# Patient Record
Sex: FEMALE | Race: WHITE | Employment: OTHER | URBAN - METROPOLITAN AREA
[De-identification: names, ages, dates, MRNs, and addresses within clinical notes are randomized per-mention and may not be internally consistent; named-entity substitution may affect disease eponyms.]

---

## 2021-03-01 NOTE — PROGRESS NOTES
Patient pre-assessment complete for [facility] scheduled for 3/2/2021, arrival time 0600. Patient verified using . Patient instructed to bring all home medications in labeled bottles on the day of procedure. NPO status reinforced. Patient informed to take a full dose aspirin 325mg  or 81 mg x 4 on the day of procedure. Instructed they can take all other medications excluding vitamins & supplements. Patient verbalizes understanding of all instructions & denies any questions at this time.

## 2021-03-02 ENCOUNTER — HOSPITAL ENCOUNTER (OUTPATIENT)
Dept: CARDIAC CATH/INVASIVE PROCEDURES | Age: 81
Discharge: HOME | End: 2021-03-02
Attending: INTERNAL MEDICINE | Admitting: INTERNAL MEDICINE
Payer: MEDICARE

## 2021-03-02 VITALS
HEART RATE: 61 BPM | BODY MASS INDEX: 21.79 KG/M2 | TEMPERATURE: 98.2 F | WEIGHT: 123 LBS | SYSTOLIC BLOOD PRESSURE: 136 MMHG | DIASTOLIC BLOOD PRESSURE: 52 MMHG | OXYGEN SATURATION: 98 % | RESPIRATION RATE: 16 BRPM

## 2021-03-02 DIAGNOSIS — R07.2 PRECORDIAL PAIN: ICD-10-CM

## 2021-03-02 LAB
ANION GAP SERPL CALC-SCNC: 6 MMOL/L (ref 7–16)
ATRIAL RATE: 52 BPM
BUN SERPL-MCNC: 10 MG/DL (ref 8–23)
CALCIUM SERPL-MCNC: 8.8 MG/DL (ref 8.3–10.4)
CALCULATED P AXIS, ECG09: 68 DEGREES
CALCULATED R AXIS, ECG10: -49 DEGREES
CALCULATED T AXIS, ECG11: 55 DEGREES
CHLORIDE SERPL-SCNC: 108 MMOL/L (ref 98–107)
CO2 SERPL-SCNC: 27 MMOL/L (ref 21–32)
CREAT SERPL-MCNC: 0.78 MG/DL (ref 0.6–1)
DIAGNOSIS, 93000: NORMAL
ERYTHROCYTE [DISTWIDTH] IN BLOOD BY AUTOMATED COUNT: 12.7 % (ref 11.9–14.6)
GLUCOSE SERPL-MCNC: 80 MG/DL (ref 65–100)
HCT VFR BLD AUTO: 39.4 % (ref 35.8–46.3)
HGB BLD-MCNC: 13.3 G/DL (ref 11.7–15.4)
INR PPP: 0.9
MAGNESIUM SERPL-MCNC: 2.2 MG/DL (ref 1.8–2.4)
MCH RBC QN AUTO: 32.1 PG (ref 26.1–32.9)
MCHC RBC AUTO-ENTMCNC: 33.8 G/DL (ref 31.4–35)
MCV RBC AUTO: 95.2 FL (ref 79.6–97.8)
NRBC # BLD: 0 K/UL (ref 0–0.2)
P-R INTERVAL, ECG05: 166 MS
PLATELET # BLD AUTO: 214 K/UL (ref 150–450)
PMV BLD AUTO: 11.2 FL (ref 9.4–12.3)
POTASSIUM SERPL-SCNC: 4.2 MMOL/L (ref 3.5–5.1)
PROTHROMBIN TIME: 12.8 SEC (ref 12.5–14.7)
Q-T INTERVAL, ECG07: 422 MS
QRS DURATION, ECG06: 80 MS
QTC CALCULATION (BEZET), ECG08: 392 MS
RBC # BLD AUTO: 4.14 M/UL (ref 4.05–5.2)
SODIUM SERPL-SCNC: 141 MMOL/L (ref 136–145)
VENTRICULAR RATE, ECG03: 52 BPM
WBC # BLD AUTO: 5.5 K/UL (ref 4.3–11.1)

## 2021-03-02 PROCEDURE — 99152 MOD SED SAME PHYS/QHP 5/>YRS: CPT

## 2021-03-02 PROCEDURE — 93458 L HRT ARTERY/VENTRICLE ANGIO: CPT | Performed by: INTERNAL MEDICINE

## 2021-03-02 PROCEDURE — C1769 GUIDE WIRE: HCPCS

## 2021-03-02 PROCEDURE — 74011000250 HC RX REV CODE- 250: Performed by: INTERNAL MEDICINE

## 2021-03-02 PROCEDURE — 77030016699 HC CATH ANGI DX INFN1 CARD -A

## 2021-03-02 PROCEDURE — 83735 ASSAY OF MAGNESIUM: CPT

## 2021-03-02 PROCEDURE — 99153 MOD SED SAME PHYS/QHP EA: CPT

## 2021-03-02 PROCEDURE — 77030015766

## 2021-03-02 PROCEDURE — 85610 PROTHROMBIN TIME: CPT

## 2021-03-02 PROCEDURE — 85027 COMPLETE CBC AUTOMATED: CPT

## 2021-03-02 PROCEDURE — 80048 BASIC METABOLIC PNL TOTAL CA: CPT

## 2021-03-02 PROCEDURE — 99152 MOD SED SAME PHYS/QHP 5/>YRS: CPT | Performed by: INTERNAL MEDICINE

## 2021-03-02 PROCEDURE — 74011000636 HC RX REV CODE- 636: Performed by: INTERNAL MEDICINE

## 2021-03-02 PROCEDURE — 74011250636 HC RX REV CODE- 250/636: Performed by: INTERNAL MEDICINE

## 2021-03-02 PROCEDURE — C1894 INTRO/SHEATH, NON-LASER: HCPCS

## 2021-03-02 PROCEDURE — 93005 ELECTROCARDIOGRAM TRACING: CPT | Performed by: INTERNAL MEDICINE

## 2021-03-02 PROCEDURE — 77030029997 HC DEV COM RDL R BND TELE -B

## 2021-03-02 PROCEDURE — 93458 L HRT ARTERY/VENTRICLE ANGIO: CPT

## 2021-03-02 PROCEDURE — 77030013687 HC GD NDL BARD -B

## 2021-03-02 RX ORDER — MIDAZOLAM HYDROCHLORIDE 1 MG/ML
.5-2 INJECTION, SOLUTION INTRAMUSCULAR; INTRAVENOUS
Status: DISCONTINUED | OUTPATIENT
Start: 2021-03-02 | End: 2021-03-02 | Stop reason: HOSPADM

## 2021-03-02 RX ORDER — LIDOCAINE HYDROCHLORIDE 10 MG/ML
3-20 INJECTION, SOLUTION EPIDURAL; INFILTRATION; INTRACAUDAL; PERINEURAL ONCE
Status: COMPLETED | OUTPATIENT
Start: 2021-03-02 | End: 2021-03-02

## 2021-03-02 RX ORDER — GUAIFENESIN 100 MG/5ML
324 LIQUID (ML) ORAL ONCE
Status: DISCONTINUED | OUTPATIENT
Start: 2021-03-02 | End: 2021-03-02 | Stop reason: HOSPADM

## 2021-03-02 RX ORDER — SODIUM CHLORIDE 9 MG/ML
75 INJECTION, SOLUTION INTRAVENOUS CONTINUOUS
Status: DISCONTINUED | OUTPATIENT
Start: 2021-03-02 | End: 2021-03-02 | Stop reason: HOSPADM

## 2021-03-02 RX ORDER — HYDROMORPHONE HYDROCHLORIDE 2 MG/ML
1-2 INJECTION, SOLUTION INTRAMUSCULAR; INTRAVENOUS; SUBCUTANEOUS
Status: DISCONTINUED | OUTPATIENT
Start: 2021-03-02 | End: 2021-03-02 | Stop reason: HOSPADM

## 2021-03-02 RX ORDER — HEPARIN SODIUM 200 [USP'U]/100ML
2 INJECTION, SOLUTION INTRAVENOUS CONTINUOUS
Status: DISCONTINUED | OUTPATIENT
Start: 2021-03-02 | End: 2021-03-02 | Stop reason: HOSPADM

## 2021-03-02 RX ORDER — SODIUM CHLORIDE 0.9 % (FLUSH) 0.9 %
5 SYRINGE (ML) INJECTION AS NEEDED
Status: DISCONTINUED | OUTPATIENT
Start: 2021-03-02 | End: 2021-03-02 | Stop reason: HOSPADM

## 2021-03-02 RX ADMIN — IOPAMIDOL 90 ML: 755 INJECTION, SOLUTION INTRAVENOUS at 08:59

## 2021-03-02 RX ADMIN — HEPARIN SODIUM 2 ML: 10000 INJECTION INTRAVENOUS; SUBCUTANEOUS at 08:42

## 2021-03-02 RX ADMIN — LIDOCAINE HYDROCHLORIDE 3 ML: 10 INJECTION, SOLUTION EPIDURAL; INFILTRATION; INTRACAUDAL; PERINEURAL at 08:41

## 2021-03-02 RX ADMIN — MIDAZOLAM 2 MG: 1 INJECTION INTRAMUSCULAR; INTRAVENOUS at 08:27

## 2021-03-02 RX ADMIN — HEPARIN SODIUM 2 UNITS/HR: 5000 INJECTION, SOLUTION INTRAVENOUS; SUBCUTANEOUS at 08:09

## 2021-03-02 NOTE — PROGRESS NOTES
Patient up to bedside, vital signs and site stable. Patient ambulated to bathroom without difficulty. Patient voided without difficulty. Vascular site stable. Discharge instructions and home medications reviewed with patient. Time allowed for questions and answers. 1100 Patient ambulated second time without difficulty. Site stable after ambulation. Peripheral IV sites dc'd without difficulty with tips intact. Patient discharged to home with family.

## 2021-03-02 NOTE — PROCEDURES
Brief Cardiac Procedure Note    Patient: Cate Gallegos MRN: [id number]  SSN: [id number]    YOB: 1940  Age: [de-identified] y.o. Sex: female      Date of Procedure: 3/2/2021     Pre-procedure Diagnosis: Coronary Artery Disease    Post-procedure Diagnosis: Non-cardiac Chest Pain    Reason for Procedure: Worsening Angina    Procedure: Left Heart Catheterization    Brief Description of Procedure: via lra    Performed By: Brad Garcia MD     Assistants:     Anesthesia: Moderate Sedation    Estimated Blood Loss: Less than 10 mL      Specimens: None    Implants: None    Findings: nml ef and cors    Complications: None    Recommendations: Continue medical therapy.     Signed By: Brad Garcia MD     March 2, 2021

## 2021-03-02 NOTE — PROGRESS NOTES
Terumo band completely deflated. 1035 Terumo band removed from left wrist using sterile technique. Sterile dressing applied. No signs and symptoms of bleeding, oozing or hematoma.

## 2021-03-02 NOTE — PROGRESS NOTES
Report received from [facility] RN. Procedural findings communicated. Intra procedural  medication administration reviewed. Progression of care discussed.      Patient received into [address] [facility] 7 post sheath removal.     Access site without bleeding or swelling yes    Dressing dry and intact yes    Patient instructed to limit movement to left upper extremity    Routine post procedural vital signs and site assessment initiated yes

## 2021-03-02 NOTE — PROCEDURES
[facility]  CARDIAC CATH    Name:  Saadia Jansen  MR#:  [id number]  :  1940  ACCOUNT #:  [de-identified]  DATE OF SERVICE:  2021    PROCEDURES PERFORMED:  Cardiac catheterization. PREOPERATIVE DIAGNOSES:  Chest pain due to coronary artery obstruction. POSTOPERATIVE DIAGNOSES:  Chest pain of uncertain etiology. SURGEON:  Dipesh Singh MD    ASSISTANT:  Benjamin Meckel, RCIS    ESTIMATED BLOOD LOSS:  Zero. SPECIMENS REMOVED:  Zero. COMPLICATIONS:  Zero. IMPLANTS:  none. ANESTHESIA:  Conscious sedation administered by Jarek Flores RN under my direction with continuous blood pressure, pulse and oxygen saturation monitoring. A total of 2 mg of Versed was given during the procedure whose start time was 08:30 and end time was 08:59. HISTORY:  This is an 40-year-old lady with multiple risk factors for coronary artery disease. She is having problems with persistent chest pain. A nuclear stress test suggested lateral wall ischemia. Cardiac catheterization is recommended. PROCEDURE:  Left heart catheterization with left ventriculography and coronary angiography was carried out from the left radial artery by modified Seldinger technique with a 5-German multipurpose, Cossayuna and #3.5 left Desiree. She tolerated it well. FINDINGS:  The central aortic pressure is 120/70 mmHg. Left ventricular end-diastolic pressure is 12 mmHg. There is no gradient on pullback across the aortic valve. The overall left ventricular size is normal.  The wall motion is normal.  Ejection fraction is 63%. Coronary angiography reveals the right coronary artery to be normal.  On the left, the main is normal.  It divides into an LAD and left circumflex. The LAD has minor midportion atherosclerotic irregularity. The left circumflex is a large normal looking vessel. IMPRESSION:  1. Normal left ventricular function. 2.  Minor coronary artery disease.     RECOMMENDATIONS:  Consider other cause for this patient's chest pain other than ischemic heart disease.         Susan Alford MD      [id number]  D:  03/02/2021 9:12  T:  03/02/2021 10:17  JOB #:  [id number]

## 2021-03-02 NOTE — PROGRESS NOTES
Patient received to [address] [place] [address] room # 12  Ambulatory from [facility]. Patient scheduled for [facility] today with Dr Tera Young. Procedure reviewed & questions answered, voiced good understanding consent obtained & placed on chart. All medications and medical history reviewed. Will prep patient per orders. Patient & family updated on plan of care. The patient has a fraility score of 3-MANAGING WELL, based on reports no recent falls.

## 2021-03-02 NOTE — DISCHARGE INSTRUCTIONS
HEART CATHETERIZATION/ANGIOGRAPHY DISCHARGE INSTRUCTIONS    1. Check puncture site frequently for swelling or bleeding. If there is any bleeding, lie down and apply pressure over the area with a clean towel or washcloth and call 911. Notify your doctor for any redness, swelling, drainage, or oozing from the puncture site. Notify your doctor for any fever or chills. 2. If the extremity becomes cold, numb, or painful call Dr. Bianka Spann at [phone].  3. Activity should be limited for the next 48 hours. Avoid pushing, pulling and bending of affected wrist for 48 hours. No heavy lifting (anything over 5 pounds) for 3 days. No driving for 48 hours. 4. You may resume your usual diet. Drink more fluids than usual.  5. Have a responsible person drive you home and stay with you for at least 24 hours after your heart catheterization/angiography. 6. You may remove bandage from your right arm  in 24 hours. You may shower in 24 hours. No tub baths, hot tubs, or swimming for 1 week. Do not place any lotions, creams, powders, or ointments over puncture site for 1 week. You may place a clean band-aid over the puncture site each day for 5 days. Change daily. I have read the above instructions and have had the opportunity to ask questions.

## 2021-03-02 NOTE — PROGRESS NOTES
TRANSFER - OUT REPORT:    [facility]  LRA  Diagnostic clean  Versed 2 mg  Band 12 ml  No bleeding/hematoma    Verbal report given to Maggy(name) on Earle Ruffin  being transferred to CPRU(unit) for routine progression of care       Report consisted of patients Situation, Background, Assessment and   Recommendations(SBAR). Information from the following report(s) SBAR and Procedure Summary was reviewed with the receiving nurse. Lines:   Peripheral IV 03/02/21 Right Antecubital (Active)       Peripheral IV 03/02/21 Left Antecubital (Active)        Opportunity for questions and clarification was provided.

## 2023-05-26 ENCOUNTER — APPOINTMENT (RX ONLY)
Dept: URBAN - METROPOLITAN AREA CLINIC 330 | Facility: CLINIC | Age: 83
Setting detail: DERMATOLOGY
End: 2023-05-26

## 2023-05-26 DIAGNOSIS — L82.1 OTHER SEBORRHEIC KERATOSIS: ICD-10-CM

## 2023-05-26 DIAGNOSIS — D22 MELANOCYTIC NEVI: ICD-10-CM

## 2023-05-26 DIAGNOSIS — L57.8 OTHER SKIN CHANGES DUE TO CHRONIC EXPOSURE TO NONIONIZING RADIATION: ICD-10-CM

## 2023-05-26 DIAGNOSIS — Z71.89 OTHER SPECIFIED COUNSELING: ICD-10-CM

## 2023-05-26 DIAGNOSIS — L81.4 OTHER MELANIN HYPERPIGMENTATION: ICD-10-CM

## 2023-05-26 PROBLEM — D22.71 MELANOCYTIC NEVI OF RIGHT LOWER LIMB, INCLUDING HIP: Status: ACTIVE | Noted: 2023-05-26

## 2023-05-26 PROBLEM — D22.5 MELANOCYTIC NEVI OF TRUNK: Status: ACTIVE | Noted: 2023-05-26

## 2023-05-26 PROCEDURE — ? TREATMENT REGIMEN

## 2023-05-26 PROCEDURE — ? REFERRAL CORRESPONDENCE

## 2023-05-26 PROCEDURE — ? COUNSELING

## 2023-05-26 PROCEDURE — ? FULL BODY SKIN EXAM

## 2023-05-26 PROCEDURE — ? EDUCATIONAL RESOURCES PROVIDED

## 2023-05-26 PROCEDURE — 99203 OFFICE O/P NEW LOW 30 MIN: CPT

## 2023-05-26 ASSESSMENT — LOCATION ZONE DERM
LOCATION ZONE: LEG
LOCATION ZONE: TRUNK
LOCATION ZONE: NECK
LOCATION ZONE: FACE
LOCATION ZONE: ARM

## 2023-05-26 ASSESSMENT — LOCATION DETAILED DESCRIPTION DERM
LOCATION DETAILED: LEFT PROXIMAL DORSAL FOREARM
LOCATION DETAILED: LEFT FOREHEAD
LOCATION DETAILED: LEFT INFERIOR CENTRAL MALAR CHEEK
LOCATION DETAILED: RIGHT INFERIOR ANTERIOR NECK
LOCATION DETAILED: RIGHT PROXIMAL POSTERIOR THIGH
LOCATION DETAILED: RIGHT PROXIMAL DORSAL FOREARM
LOCATION DETAILED: RIGHT MEDIAL TRAPEZIAL NECK
LOCATION DETAILED: INFERIOR THORACIC SPINE

## 2023-05-26 ASSESSMENT — LOCATION SIMPLE DESCRIPTION DERM
LOCATION SIMPLE: LEFT FOREHEAD
LOCATION SIMPLE: LEFT CHEEK
LOCATION SIMPLE: LEFT FOREARM
LOCATION SIMPLE: RIGHT ANTERIOR NECK
LOCATION SIMPLE: POSTERIOR NECK
LOCATION SIMPLE: RIGHT POSTERIOR THIGH
LOCATION SIMPLE: RIGHT FOREARM
LOCATION SIMPLE: UPPER BACK

## 2023-05-26 NOTE — PROCEDURE: MIPS QUALITY
Quality 431: Preventive Care And Screening: Unhealthy Alcohol Use - Screening: Patient not identified as an unhealthy alcohol user when screened for unhealthy alcohol use using a systematic screening method
Quality 110: Preventive Care And Screening: Influenza Immunization: Influenza Immunization Administered during Influenza season
Quality 47: Advance Care Plan: Advance care planning not documented, reason not otherwise specified.
Quality 130: Documentation Of Current Medications In The Medical Record: Current Medications Documented
Quality 402: Tobacco Use And Help With Quitting Among Adolescents: Patient screened for tobacco and never smoked
Quality 226: Preventive Care And Screening: Tobacco Use: Screening And Cessation Intervention: Patient screened for tobacco use and is an ex/non-smoker
Detail Level: Detailed

## 2024-01-03 ENCOUNTER — TELEPHONE (OUTPATIENT)
Dept: PRIMARY CARE CLINIC | Facility: CLINIC | Age: 84
End: 2024-01-03

## 2024-01-03 PROBLEM — M81.0 OSTEOPOROSIS OF MULTIPLE SITES WITHOUT PATHOLOGICAL FRACTURE: Status: ACTIVE | Noted: 2024-01-03

## 2024-01-03 SDOH — HEALTH STABILITY: PHYSICAL HEALTH: ON AVERAGE, HOW MANY DAYS PER WEEK DO YOU ENGAGE IN MODERATE TO STRENUOUS EXERCISE (LIKE A BRISK WALK)?: 7 DAYS

## 2024-01-03 SDOH — HEALTH STABILITY: PHYSICAL HEALTH: ON AVERAGE, HOW MANY MINUTES DO YOU ENGAGE IN EXERCISE AT THIS LEVEL?: 20 MIN

## 2024-02-16 ENCOUNTER — TELEPHONE (OUTPATIENT)
Age: 84
End: 2024-02-16

## 2024-02-16 NOTE — TELEPHONE ENCOUNTER
Pt.is calling reporting she has been more fatigued lately and labored breathing.Having harder time getting up/down stairs which is new for her.She has been having CP off/on.Appt.made 2/20 11:45.

## 2024-02-20 ENCOUNTER — OFFICE VISIT (OUTPATIENT)
Age: 84
End: 2024-02-20
Payer: MEDICARE

## 2024-02-20 VITALS
HEIGHT: 63 IN | DIASTOLIC BLOOD PRESSURE: 62 MMHG | BODY MASS INDEX: 23.83 KG/M2 | SYSTOLIC BLOOD PRESSURE: 134 MMHG | WEIGHT: 134.5 LBS | HEART RATE: 64 BPM

## 2024-02-20 DIAGNOSIS — R07.2 PRECORDIAL PAIN: ICD-10-CM

## 2024-02-20 DIAGNOSIS — R07.2 PRECORDIAL PAIN: Primary | ICD-10-CM

## 2024-02-20 PROCEDURE — 99214 OFFICE O/P EST MOD 30 MIN: CPT | Performed by: INTERNAL MEDICINE

## 2024-02-20 PROCEDURE — 93000 ELECTROCARDIOGRAM COMPLETE: CPT | Performed by: INTERNAL MEDICINE

## 2024-02-20 PROCEDURE — 1123F ACP DISCUSS/DSCN MKR DOCD: CPT | Performed by: INTERNAL MEDICINE

## 2024-02-20 RX ORDER — AZELASTINE 1 MG/ML
1 SPRAY, METERED NASAL 2 TIMES DAILY
COMMUNITY
Start: 2023-09-27

## 2024-02-20 RX ORDER — CETIRIZINE HYDROCHLORIDE 10 MG/1
10 TABLET ORAL DAILY
COMMUNITY

## 2024-02-20 NOTE — PROGRESS NOTES
[facility] CARDIOLOGY  [address] [place] [address], [address]  [place] [address]  PHONE: [phone]        24        NAME:  Irma Kidd  : 1940  MRN: [id number]     CHIEF COMPLAINT:    Chest Pain      SUBJECTIVE:       First visit 3 years. Returns c/o chest pain and fatigue and reyes.        Medications were all reviewed with the patient today and updated as necessary.   Current Outpatient Medications   Medication Sig    azelastine (ASTELIN) 0.1 % nasal spray 1 spray by Nasal route 2 times daily    cetirizine (ZYRTEC) 10 MG tablet Take 1 tablet by mouth daily    Coenzyme Q10 10 MG CAPS Take 19 mg by mouth daily    UNABLE TO FIND MCT Oil  Vision Essentials Gold  Eyedrate 1300 mg  Enterovite  Digestzymes  optiMagNeuro  Methyl Guard Plus  Insomnitol  Adrenastim  ADK evail  Optifiberlean  OmegaPure 900 tg  Trizomal Glutathione  Turmeric    vitamin D (CHOLECALCIFEROL) 25 MCG (1000 UT) TABS tablet Take by mouth daily    folic acid (FOLVITE) 800 MCG tablet Take 1 tablet by mouth daily    levothyroxine (SYNTHROID) 75 MCG tablet Take by mouth every morning (before breakfast)    psyllium (METAMUCIL) 58.6 % packet Take by mouth 2 times daily     No current facility-administered medications for this visit.        Allergies   Allergen Reactions    Tetracyclines & Related Other (See Comments)     She cannot remember reaction - It was many years ago   GI intolerance           PHYSICAL EXAM:     Wt Readings from Last 3 Encounters:   24 61 kg (134 lb 8 oz)   21 55.4 kg (122 lb 3.2 oz)   21 55.8 kg (123 lb)     BP Readings from Last 3 Encounters:   24 134/62   21 130/68   21 (!) 128/58       /62   Pulse 64   Ht 1.6 m (5' 3\")   Wt 61 kg (134 lb 8 oz)   BMI 23.83 kg/m²     Physical Exam  Vitals reviewed.   HENT:      Head: Normocephalic and atraumatic.   Eyes:      Extraocular Movements: Extraocular movements intact.      Pupils: Pupils are equal, round, and reactive to

## 2024-02-21 LAB — NT PRO BNP: 168 PG/ML

## 2024-03-12 ENCOUNTER — OFFICE VISIT (OUTPATIENT)
Dept: PRIMARY CARE CLINIC | Facility: CLINIC | Age: 84
End: 2024-03-12
Payer: MEDICARE

## 2024-03-12 VITALS
HEART RATE: 78 BPM | WEIGHT: 134 LBS | OXYGEN SATURATION: 97 % | SYSTOLIC BLOOD PRESSURE: 124 MMHG | DIASTOLIC BLOOD PRESSURE: 64 MMHG | BODY MASS INDEX: 23.74 KG/M2

## 2024-03-12 DIAGNOSIS — M81.0 AGE-RELATED OSTEOPOROSIS WITHOUT CURRENT PATHOLOGICAL FRACTURE: Primary | ICD-10-CM

## 2024-03-12 DIAGNOSIS — J30.2 SEASONAL ALLERGIES: ICD-10-CM

## 2024-03-12 DIAGNOSIS — H02.88A MEIBOMIAN GLAND DYSFUNCTION (MGD), BILATERAL, BOTH UPPER AND LOWER LIDS: ICD-10-CM

## 2024-03-12 DIAGNOSIS — R73.03 PREDIABETES: ICD-10-CM

## 2024-03-12 DIAGNOSIS — F51.01 PRIMARY INSOMNIA: ICD-10-CM

## 2024-03-12 DIAGNOSIS — M81.0 OSTEOPOROSIS OF MULTIPLE SITES WITHOUT PATHOLOGICAL FRACTURE: ICD-10-CM

## 2024-03-12 DIAGNOSIS — Z78.0 POST-MENOPAUSAL: ICD-10-CM

## 2024-03-12 DIAGNOSIS — E03.9 HYPOTHYROIDISM, UNSPECIFIED TYPE: ICD-10-CM

## 2024-03-12 DIAGNOSIS — H02.88B MEIBOMIAN GLAND DYSFUNCTION (MGD), BILATERAL, BOTH UPPER AND LOWER LIDS: ICD-10-CM

## 2024-03-12 DIAGNOSIS — H04.123 BILATERAL DRY EYES: ICD-10-CM

## 2024-03-12 DIAGNOSIS — Z12.31 ENCOUNTER FOR SCREENING MAMMOGRAM FOR MALIGNANT NEOPLASM OF BREAST: ICD-10-CM

## 2024-03-12 DIAGNOSIS — E78.00 HIGH CHOLESTEROL: ICD-10-CM

## 2024-03-12 DIAGNOSIS — Z12.39 ENCOUNTER FOR SCREENING FOR MALIGNANT NEOPLASM OF BREAST, UNSPECIFIED SCREENING MODALITY: ICD-10-CM

## 2024-03-12 PROCEDURE — 1123F ACP DISCUSS/DSCN MKR DOCD: CPT | Performed by: INTERNAL MEDICINE

## 2024-03-12 PROCEDURE — 99204 OFFICE O/P NEW MOD 45 MIN: CPT | Performed by: INTERNAL MEDICINE

## 2024-03-12 RX ORDER — LEVOTHYROXINE SODIUM 0.07 MG/1
75 TABLET ORAL
Qty: 90 TABLET | Refills: 0 | Status: SHIPPED | OUTPATIENT
Start: 2024-03-12

## 2024-03-12 RX ORDER — TRAZODONE HYDROCHLORIDE 50 MG/1
50 TABLET ORAL NIGHTLY
Qty: 90 TABLET | Refills: 1 | Status: SHIPPED | OUTPATIENT
Start: 2024-03-12

## 2024-03-12 SDOH — HEALTH STABILITY: PHYSICAL HEALTH: ON AVERAGE, HOW MANY DAYS PER WEEK DO YOU ENGAGE IN MODERATE TO STRENUOUS EXERCISE (LIKE A BRISK WALK)?: 7 DAYS

## 2024-03-12 SDOH — HEALTH STABILITY: PHYSICAL HEALTH: ON AVERAGE, HOW MANY MINUTES DO YOU ENGAGE IN EXERCISE AT THIS LEVEL?: 10 MIN

## 2024-03-12 ASSESSMENT — PATIENT HEALTH QUESTIONNAIRE - PHQ9
2. FEELING DOWN, DEPRESSED OR HOPELESS: 0
SUM OF ALL RESPONSES TO PHQ QUESTIONS 1-9: 0
1. LITTLE INTEREST OR PLEASURE IN DOING THINGS: 0
SUM OF ALL RESPONSES TO PHQ QUESTIONS 1-9: 0
SUM OF ALL RESPONSES TO PHQ9 QUESTIONS 1 & 2: 0
SUM OF ALL RESPONSES TO PHQ QUESTIONS 1-9: 0
SUM OF ALL RESPONSES TO PHQ QUESTIONS 1-9: 0

## 2024-03-12 NOTE — PROGRESS NOTES
FOLLOW UP VISIT    Subjective:    Irma Kidd (: 1940) is a 83 y.o., female,   Chief Complaint   Patient presents with    New Patient       HPI:  HPI  83 year old in to [facility].   Chest Pain has seen Cardiology Dr. Ledezma  Anxiety   Depression  GERD   Hypothyroid on meds last labs nl   High cholesterol- pt states she's as never had this  Adrenal D/O is going to [facility]  High blood sugar A1C 5,8   HCM  Mammogram  DEXA due  Colonoscopy     The following portions of the patient's history were reviewed and updated as appropriate:      Past Medical History:   Diagnosis Date    Allergic rhinitis minor but very early    Anxiety ,,    Stress reactions    Arthritis     Depression     GERD (gastroesophageal reflux disease) 2020    treated by [facility]    Hearing loss 2004    Hearing Aids    Hypothyroidism     Stress reaction    Psychiatric disorder     Depression, Anxiety    Thyroid disease     hypothryoidism       Past Surgical History:   Procedure Laterality Date    EYE SURGERY      OTHER SURGICAL HISTORY      Tonsilectomy    TONSILLECTOMY         Family History   Problem Relation Age of Onset    Breast Cancer Mother         Late in life    Depression Father         Childhood abuse    Hearing Loss Father     Heart Disease Father     Breast Cancer Maternal Aunt         When she conceived child    Breast Cancer Maternal Aunt         Midlife       Social History     Socioeconomic History    Marital status:      Spouse name: Not on file    Number of children: Not on file    Years of education: Not on file    Highest education level: Not on file   Occupational History    Not on file   Tobacco Use    Smoking status: Never    Smokeless tobacco: Never    Tobacco comments:     None   Substance and Sexual Activity    Alcohol use: Never    Drug use: Never    Sexual activity: Not Currently     Partners: Male     Comment:   56 years   Other Topics Concern    Not on file   Social

## 2024-05-03 ENCOUNTER — TELEPHONE (OUTPATIENT)
Dept: PRIMARY CARE CLINIC | Facility: CLINIC | Age: 84
End: 2024-05-03

## 2024-05-03 NOTE — TELEPHONE ENCOUNTER
Pt called and said dr poole had given referrals for mamogram and another test but they reached out to the pt and said that there are no orders in her chart for these tests to be done.  Pt said she would call back again on Monday to get the status of this if she doesn't hear back today

## 2024-05-03 NOTE — TELEPHONE ENCOUNTER
LVM notifying Pt she has an appointment for the bone density and the mammo scheduled for 05/07/24 and left radiology/sched phone number if she had any additional questions/concerns.

## 2024-05-07 ENCOUNTER — HOSPITAL ENCOUNTER (OUTPATIENT)
Dept: MAMMOGRAPHY | Age: 84
Discharge: HOME | End: 2024-05-10
Attending: INTERNAL MEDICINE
Payer: MEDICARE

## 2024-05-07 DIAGNOSIS — Z12.31 ENCOUNTER FOR SCREENING MAMMOGRAM FOR MALIGNANT NEOPLASM OF BREAST: ICD-10-CM

## 2024-05-07 DIAGNOSIS — Z12.39 ENCOUNTER FOR SCREENING FOR MALIGNANT NEOPLASM OF BREAST, UNSPECIFIED SCREENING MODALITY: ICD-10-CM

## 2024-05-07 DIAGNOSIS — M81.0 OSTEOPOROSIS OF MULTIPLE SITES WITHOUT PATHOLOGICAL FRACTURE: ICD-10-CM

## 2024-05-07 PROCEDURE — 77080 DXA BONE DENSITY AXIAL: CPT

## 2024-05-07 PROCEDURE — 77067 SCR MAMMO BI INCL CAD: CPT

## 2024-05-08 ENCOUNTER — TELEPHONE (OUTPATIENT)
Dept: PRIMARY CARE CLINIC | Facility: CLINIC | Age: 84
End: 2024-05-08

## 2024-05-08 NOTE — TELEPHONE ENCOUNTER
Spoke with the patient about her bone density results and let her know that. The doctor said she has Osteoporosis and she will go over her results with the patient at her next apt on 5/15.

## 2024-05-08 NOTE — TELEPHONE ENCOUNTER
----- Message from Nevaeh Zamora MD sent at 5/8/2024 10:20 AM EDT -----  Please inform patient she has osteoporosis- we will discuss this at her apt next week

## 2024-05-13 SDOH — ECONOMIC STABILITY: HOUSING INSECURITY
IN THE LAST 12 MONTHS, WAS THERE A TIME WHEN YOU DID NOT HAVE A STEADY PLACE TO SLEEP OR SLEPT IN A SHELTER (INCLUDING NOW)?: NO

## 2024-05-13 SDOH — ECONOMIC STABILITY: FOOD INSECURITY: WITHIN THE PAST 12 MONTHS, YOU WORRIED THAT YOUR FOOD WOULD RUN OUT BEFORE YOU GOT MONEY TO BUY MORE.: NEVER TRUE

## 2024-05-13 SDOH — ECONOMIC STABILITY: INCOME INSECURITY: HOW HARD IS IT FOR YOU TO PAY FOR THE VERY BASICS LIKE FOOD, HOUSING, MEDICAL CARE, AND HEATING?: NOT VERY HARD

## 2024-05-13 SDOH — ECONOMIC STABILITY: FOOD INSECURITY: WITHIN THE PAST 12 MONTHS, THE FOOD YOU BOUGHT JUST DIDN'T LAST AND YOU DIDN'T HAVE MONEY TO GET MORE.: NEVER TRUE

## 2024-05-13 SDOH — ECONOMIC STABILITY: TRANSPORTATION INSECURITY
IN THE PAST 12 MONTHS, HAS LACK OF TRANSPORTATION KEPT YOU FROM MEETINGS, WORK, OR FROM GETTING THINGS NEEDED FOR DAILY LIVING?: NO

## 2024-05-15 ENCOUNTER — OFFICE VISIT (OUTPATIENT)
Dept: PRIMARY CARE CLINIC | Facility: CLINIC | Age: 84
End: 2024-05-15
Payer: MEDICARE

## 2024-05-15 VITALS
OXYGEN SATURATION: 97 % | BODY MASS INDEX: 24.34 KG/M2 | DIASTOLIC BLOOD PRESSURE: 68 MMHG | WEIGHT: 137.4 LBS | SYSTOLIC BLOOD PRESSURE: 122 MMHG | HEART RATE: 67 BPM | HEIGHT: 63 IN

## 2024-05-15 DIAGNOSIS — R73.03 PREDIABETES: ICD-10-CM

## 2024-05-15 DIAGNOSIS — E78.00 HIGH CHOLESTEROL: ICD-10-CM

## 2024-05-15 DIAGNOSIS — E03.9 HYPOTHYROIDISM, UNSPECIFIED TYPE: ICD-10-CM

## 2024-05-15 DIAGNOSIS — M81.0 AGE-RELATED OSTEOPOROSIS WITHOUT CURRENT PATHOLOGICAL FRACTURE: Primary | ICD-10-CM

## 2024-05-15 LAB
ALBUMIN SERPL-MCNC: 3.6 G/DL (ref 3.2–4.6)
ALBUMIN/GLOB SERPL: 1.1 (ref 1–1.9)
ALP SERPL-CCNC: 55 U/L (ref 35–104)
ALT SERPL-CCNC: 14 U/L (ref 12–65)
ANION GAP SERPL CALC-SCNC: 9 MMOL/L (ref 9–18)
AST SERPL-CCNC: 25 U/L (ref 15–37)
BILIRUB SERPL-MCNC: 0.3 MG/DL (ref 0–1.2)
BUN SERPL-MCNC: 18 MG/DL (ref 8–23)
CALCIUM SERPL-MCNC: 9.6 MG/DL (ref 8.8–10.2)
CHLORIDE SERPL-SCNC: 101 MMOL/L (ref 98–107)
CHOLEST SERPL-MCNC: 173 MG/DL (ref 0–200)
CO2 SERPL-SCNC: 28 MMOL/L (ref 20–28)
CREAT SERPL-MCNC: 0.8 MG/DL (ref 0.6–1.1)
EST. AVERAGE GLUCOSE BLD GHB EST-MCNC: 121 MG/DL
GLOBULIN SER CALC-MCNC: 3.3 G/DL (ref 2.3–3.5)
GLUCOSE SERPL-MCNC: 97 MG/DL (ref 70–99)
HBA1C MFR BLD: 5.9 % (ref 0–5.6)
HDLC SERPL-MCNC: 63 MG/DL (ref 40–60)
HDLC SERPL: 2.7 (ref 0–5)
LDLC SERPL CALC-MCNC: 94 MG/DL (ref 0–100)
POTASSIUM SERPL-SCNC: 4.7 MMOL/L (ref 3.5–5.1)
PROT SERPL-MCNC: 7 G/DL (ref 6.3–8.2)
SODIUM SERPL-SCNC: 138 MMOL/L (ref 136–145)
TRIGL SERPL-MCNC: 80 MG/DL (ref 0–150)
TSH, 3RD GENERATION: 3.37 UIU/ML (ref 0.27–4.2)
VLDLC SERPL CALC-MCNC: 16 MG/DL (ref 6–23)

## 2024-05-15 PROCEDURE — 99214 OFFICE O/P EST MOD 30 MIN: CPT | Performed by: INTERNAL MEDICINE

## 2024-05-15 PROCEDURE — 1123F ACP DISCUSS/DSCN MKR DOCD: CPT | Performed by: INTERNAL MEDICINE

## 2024-05-15 ASSESSMENT — ENCOUNTER SYMPTOMS: RESPIRATORY NEGATIVE: 1

## 2024-05-15 NOTE — PROGRESS NOTES
vitamin D (CHOLECALCIFEROL) 125 MCG (5000 UT) CAPS capsule Take by mouth daily      psyllium (METAMUCIL) 58.6 % packet Take by mouth 2 times daily      traZODone (DESYREL) 50 MG tablet Take 1 tablet by mouth nightly (Patient not taking: Reported on 5/15/2024) 90 tablet 1     No current facility-administered medications for this visit.       Allergies as of 05/15/2024 - Fully Reviewed 05/15/2024   Allergen Reaction Noted    Tetracyclines & related Other (See Comments) 07/19/2017       Review of Systems   Respiratory: Negative.     Genitourinary: Negative.        Objective:    Blood pressure 122/68, pulse 67, height 1.6 m (5' 3\"), weight 62.3 kg (137 lb 6.4 oz), SpO2 97 %.    Physical Exam  Vitals and nursing note reviewed.   Constitutional:       Appearance: Normal appearance.   Cardiovascular:      Rate and Rhythm: Normal rate.      Pulses: Normal pulses.      Heart sounds: Normal heart sounds.   Pulmonary:      Effort: Pulmonary effort is normal.   Neurological:      Mental Status: She is alert.         No results found for this visit on 05/15/24.    Assessent & Plan    83 year old in for follow up  Chest Pain has seen Cardiology Dr. Ledezma  Anxiety   Factor V lieden d/o   Depression  GERD   Hypothyroid on meds last labs nl check labs  Osteoporosis- on did not tolearte Bisphosphonates will send to Rheum  High cholesterol- pt states she's as never had this  Adrenal D/O is going to [facility]  High blood sugar A1C 5,8 last time  HCM  Mammogram done DEXA osteoproosis Colonoscopy     The patient and/or patient representative voiced understanding and agreement with the current diagnoses, recommendations, and possible side effects.    No follow-up provider specified.      Nevaeh Zamora MD

## 2024-05-28 ENCOUNTER — TELEPHONE (OUTPATIENT)
Dept: PRIMARY CARE CLINIC | Facility: CLINIC | Age: 84
End: 2024-05-28

## 2024-05-28 NOTE — TELEPHONE ENCOUNTER
Pt reports   Bitten by tick to right eyelash approx 2 weeks ago- tick fully removed   Pt has completed one round of PO ABT, and taking Benadyrl and \"icing\" eye daily  Currently taking Ceftin 500 mg once daily for 14 days will complete this Friday 05/30/24  Right eyelash remains swollen and red  OV sched with PCP for Thursday 05/30 @ 11:10

## 2024-05-28 NOTE — TELEPHONE ENCOUNTER
----- Message from Wendy Faria sent at 5/28/2024 11:57 AM EDT -----  Regarding: [facility] Appointment Request  [facility] Appointment Request    Patient needs appointment for [facility] Appointment Type: New to Provider.    Reason for Appointment Request: Available appointments did not meet patient need   Additional Information: Pt wants to schedule an appointment with Dr. Nevaeh Zamora because she said that she was bitten by the tick attach in her eyelid in her eyelashes about 2 weeks ago. She's available for First 2 weeks of june  --------------------------------------------------------------------------------------------------------------------------    Relationship to Patient: Self     Call Back Information: OK to leave message on voicemail  Preferred Call Back Number: Phone [phone]

## 2024-05-30 ENCOUNTER — OFFICE VISIT (OUTPATIENT)
Dept: PRIMARY CARE CLINIC | Facility: CLINIC | Age: 84
End: 2024-05-30
Payer: MEDICARE

## 2024-05-30 VITALS
DIASTOLIC BLOOD PRESSURE: 62 MMHG | SYSTOLIC BLOOD PRESSURE: 128 MMHG | BODY MASS INDEX: 24.06 KG/M2 | HEIGHT: 63 IN | OXYGEN SATURATION: 98 % | WEIGHT: 135.8 LBS

## 2024-05-30 DIAGNOSIS — J30.2 SEASONAL ALLERGIES: ICD-10-CM

## 2024-05-30 DIAGNOSIS — E78.00 HIGH CHOLESTEROL: ICD-10-CM

## 2024-05-30 DIAGNOSIS — E03.9 HYPOTHYROIDISM, UNSPECIFIED TYPE: ICD-10-CM

## 2024-05-30 DIAGNOSIS — M81.0 AGE-RELATED OSTEOPOROSIS WITHOUT CURRENT PATHOLOGICAL FRACTURE: Primary | ICD-10-CM

## 2024-05-30 DIAGNOSIS — R73.03 PREDIABETES: ICD-10-CM

## 2024-05-30 PROCEDURE — 1123F ACP DISCUSS/DSCN MKR DOCD: CPT | Performed by: INTERNAL MEDICINE

## 2024-05-30 PROCEDURE — 99214 OFFICE O/P EST MOD 30 MIN: CPT | Performed by: INTERNAL MEDICINE

## 2024-05-30 RX ORDER — CEFUROXIME AXETIL 500 MG/1
500 TABLET ORAL 2 TIMES DAILY
COMMUNITY
Start: 2024-05-18 | End: 2024-06-01

## 2024-05-30 RX ORDER — TRIAMCINOLONE ACETONIDE 5 MG/G
CREAM TOPICAL
Qty: 15 G | Refills: 1 | Status: SHIPPED | OUTPATIENT
Start: 2024-05-30 | End: 2024-06-06

## 2024-05-30 ASSESSMENT — ENCOUNTER SYMPTOMS: RESPIRATORY NEGATIVE: 1

## 2024-05-30 NOTE — PROGRESS NOTES
FOLLOW UP VISIT    Subjective:    Irma Kidd (: 1940) is a 83 y.o., female,   Chief Complaint   Patient presents with    Insect Bite     Bitten by tick to right eyelash approx 2 weeks ago- tick fully removed   Pt has completed one round of PO ABT, and taking Benadyrl and \"icing\" eye daily       HPI:  Insect Bite      83 year old in to follow up.  She had a bug bite in her eye and was given two antibiotics has two doses left.  She is also c/o ezcema.  She also has allergies   Chest Pain has seen Cardiology Dr. Ledezma  Anxiety   Factor V lieden d/o   Depression  GERD   Hypothyroid on meds last labs nl   Osteoporosis- on did not tolearte Bisphosphonates   High cholesterol- pt states she's as never had this  [facility]  High blood sugar A1C 5,8   HCM  Mammogram done DEXA osteoproosis Colonoscopy     The following portions of the patient's history were reviewed and updated as appropriate:      Past Medical History:   Diagnosis Date    Allergic rhinitis minor but very early    Anxiety 1958,1960,    Stress reactions    Arthritis     Depression     GERD (gastroesophageal reflux disease) 2020    treated by [facility]    Hearing loss 2004    Hearing Aids    Hypothyroidism     Stress reaction    Psychiatric disorder     Depression, Anxiety    Thyroid disease     hypothryoidism       Past Surgical History:   Procedure Laterality Date    EYE SURGERY      OTHER SURGICAL HISTORY      Tonsilectomy    TONSILLECTOMY         Family History   Problem Relation Age of Onset    Breast Cancer Mother         Late in life    Depression Father         Childhood abuse    Hearing Loss Father     Heart Disease Father     Breast Cancer Maternal Aunt         When she conceived child    Breast Cancer Maternal Aunt         Midlife       Social History     Socioeconomic History    Marital status:      Spouse name: Not on file    Number of children: Not on file    Years of education: Not on file

## 2024-06-05 NOTE — PROGRESS NOTES
Medicare Annual Wellness Visit    Irma Kidd is here for No chief complaint on file.    Assessment & Plan   Medicare Wellness  Recommendations for Preventive Services Due: see orders and patient instructions/AVS.  Recommended screening schedule for the next 5-10 years is provided to the patient in written form: see Patient Instructions/AVS.     No follow-ups on file.     Subjective       Patient's complete Health Risk Assessment and screening values have been reviewed and are found in Flowsheets. The following problems were reviewed today and where indicated follow up appointments were made and/or referrals ordered.    No Positive Risk Factors identified today.                                  Objective      Patient-Reported Vitals  No data recorded          Allergies   Allergen Reactions    Tetracyclines & Related Other (See Comments)     She cannot remember reaction - It was many years ago   GI intolerance     Prior to Visit Medications    Medication Sig Taking? Authorizing Provider   triamcinolone (ARISTOCORT) 0.5 % cream Apply topically 2 times daily.  Nevaeh Zamora MD   NONFORMKATHARINE Take by mouth at bedtime Insomnitol supplement (melatonin, Valorin root, and additional herbs)  Destiney Bond MD NONFORMULARY Take 2 capsules by mouth daily Joint replete (NMA) [employer]  Destiney Bond MD NONFORMULARY Take 3 capsules by mouth daily Digestzymes (DFH)  Destiney Bond MD NONFORMULARY Take 2 capsules by mouth daily Pregnenolone 25 (BRC) biotic research  Destiney Bond MD NONFORMULARY Take 1 capsule by mouth daily Methyl Guard Plus  Destiney Bond MD   levothyroxine (SYNTHROID) 75 MCG tablet Take 1 tablet by mouth every morning (before breakfast)  Nevaeh Zamora MD   traZODone (DESYREL) 50 MG tablet Take 1 tablet by mouth nightly  Patient taking differently: Take 0.5 tablets by mouth nightly  Nevaeh Zamora MD   azelastine (ASTELIN) 0.1 % nasal spray 1 spray by Nasal route 2

## 2024-06-06 SDOH — HEALTH STABILITY: PHYSICAL HEALTH: ON AVERAGE, HOW MANY DAYS PER WEEK DO YOU ENGAGE IN MODERATE TO STRENUOUS EXERCISE (LIKE A BRISK WALK)?: 7 DAYS

## 2024-06-06 SDOH — HEALTH STABILITY: PHYSICAL HEALTH: ON AVERAGE, HOW MANY MINUTES DO YOU ENGAGE IN EXERCISE AT THIS LEVEL?: 30 MIN

## 2024-06-06 ASSESSMENT — LIFESTYLE VARIABLES
HOW OFTEN DO YOU HAVE A DRINK CONTAINING ALCOHOL: 1
HOW MANY STANDARD DRINKS CONTAINING ALCOHOL DO YOU HAVE ON A TYPICAL DAY: 0
HOW OFTEN DO YOU HAVE SIX OR MORE DRINKS ON ONE OCCASION: 1
HOW MANY STANDARD DRINKS CONTAINING ALCOHOL DO YOU HAVE ON A TYPICAL DAY: PATIENT DOES NOT DRINK
HOW OFTEN DO YOU HAVE A DRINK CONTAINING ALCOHOL: NEVER

## 2024-06-06 ASSESSMENT — PATIENT HEALTH QUESTIONNAIRE - PHQ9
SUM OF ALL RESPONSES TO PHQ QUESTIONS 1-9: 1
SUM OF ALL RESPONSES TO PHQ QUESTIONS 1-9: 1
2. FEELING DOWN, DEPRESSED OR HOPELESS: SEVERAL DAYS
SUM OF ALL RESPONSES TO PHQ QUESTIONS 1-9: 1
SUM OF ALL RESPONSES TO PHQ QUESTIONS 1-9: 1
1. LITTLE INTEREST OR PLEASURE IN DOING THINGS: NOT AT ALL
SUM OF ALL RESPONSES TO PHQ9 QUESTIONS 1 & 2: 1

## 2024-06-07 ENCOUNTER — TELEMEDICINE (OUTPATIENT)
Dept: PRIMARY CARE CLINIC | Facility: CLINIC | Age: 84
End: 2024-06-07
Payer: MEDICARE

## 2024-06-07 DIAGNOSIS — Z00.00 MEDICARE ANNUAL WELLNESS VISIT, SUBSEQUENT: Primary | ICD-10-CM

## 2024-06-07 PROCEDURE — 1123F ACP DISCUSS/DSCN MKR DOCD: CPT | Performed by: INTERNAL MEDICINE

## 2024-06-07 PROCEDURE — G0439 PPPS, SUBSEQ VISIT: HCPCS | Performed by: INTERNAL MEDICINE

## 2024-06-07 RX ORDER — LEVOTHYROXINE SODIUM 0.07 MG/1
75 TABLET ORAL
Qty: 90 TABLET | Refills: 0 | Status: SHIPPED | OUTPATIENT
Start: 2024-06-07

## 2024-06-20 ENCOUNTER — OFFICE VISIT (OUTPATIENT)
Dept: RHEUMATOLOGY | Age: 84
End: 2024-06-20
Payer: MEDICARE

## 2024-06-20 VITALS
OXYGEN SATURATION: 98 % | HEART RATE: 76 BPM | SYSTOLIC BLOOD PRESSURE: 108 MMHG | DIASTOLIC BLOOD PRESSURE: 68 MMHG | RESPIRATION RATE: 14 BRPM | BODY MASS INDEX: 25.03 KG/M2 | HEIGHT: 62 IN | WEIGHT: 136 LBS

## 2024-06-20 DIAGNOSIS — M81.0 POSTMENOPAUSAL OSTEOPOROSIS: ICD-10-CM

## 2024-06-20 DIAGNOSIS — M81.0 POSTMENOPAUSAL OSTEOPOROSIS: Primary | ICD-10-CM

## 2024-06-20 LAB
25(OH)D3 SERPL-MCNC: 68.4 NG/ML (ref 30–100)
CALCIUM SERPL-MCNC: 9.2 MG/DL (ref 8.8–10.2)
ERYTHROCYTE [SEDIMENTATION RATE] IN BLOOD: 1 MM/HR (ref 0–30)
MAGNESIUM SERPL-MCNC: 2.3 MG/DL (ref 1.8–2.4)
PHOSPHATE SERPL-MCNC: 3.6 MG/DL (ref 2.5–4.5)
PTH-INTACT SERPL-MCNC: 32.6 PG/ML (ref 15–65)

## 2024-06-20 PROCEDURE — 1123F ACP DISCUSS/DSCN MKR DOCD: CPT | Performed by: INTERNAL MEDICINE

## 2024-06-20 PROCEDURE — 99204 OFFICE O/P NEW MOD 45 MIN: CPT | Performed by: INTERNAL MEDICINE

## 2024-06-20 NOTE — PATIENT INSTRUCTIONS
hip, spine, or wrist. Or you may have sudden pain in your middle or lower back.  Follow-up care is a key part of your treatment and safety. Be sure to make and go to all appointments, and call your doctor if you are having problems. It's also a good idea to know your test results and keep a list of the medicines you take.  How can you care for yourself at home?  Your doctor may prescribe a bisphosphonate, such as risedronate (Actonel) or alendronate (Fosamax), for osteoporosis. If you are taking one of these medicines by mouth:  Take your medicine with a full glass of water when you first get up in the morning.  Do not lie down, eat, drink a beverage, or take any other medicine for at least 30 minutes after taking the drug. This helps prevent stomach problems.  Do not take your medicine late in the day if you forgot to take it in the morning. Skip it, and take the usual dose the next morning.  If you have side effects, tell your doctor. Your doctor may prescribe another medicine.  Get enough calcium and vitamin D. The recommended dietary allowances (RDAs) for adults younger than age 51 are 1,000 mg of calcium and 600 IU of vitamin D each day. Women ages 51 to 70 need 1,200 mg of calcium and 600 IU of vitamin D each day. Men ages 51 to 70 need 1,000 mg of calcium and 600 IU of vitamin D each day. Adults 71 and older need 1,200 mg of calcium and 800 IU of vitamin D each day. It's not clear if people who already have osteoporosis need more calcium and vitamin D than this. Talk to your doctor about what's right for you.  Eat foods rich in calcium, like yogurt, cheese, milk, and dark green vegetables. This is a good way to get the calcium you need. You can get vitamin D from eggs, fatty fish, cereal, and milk.  Ask your doctor if you need to take a calcium plus vitamin D supplement. You may be able to get enough calcium and vitamin D through your diet. Be careful with supplements. Adults ages 19 to 50 should not get more

## 2024-06-23 LAB — CRP SERPL-MCNC: <1 MG/L (ref 0–10)

## 2024-06-24 LAB
ALBUMIN SERPL ELPH-MCNC: 4 G/DL (ref 2.9–4.4)
ALBUMIN/GLOB SERPL: 1.4 (ref 0.7–1.7)
ALPHA1 GLOB SERPL ELPH-MCNC: 0.2 G/DL (ref 0–0.4)
ALPHA2 GLOB SERPL ELPH-MCNC: 0.6 G/DL (ref 0.4–1)
B-GLOBULIN SERPL ELPH-MCNC: 1 G/DL (ref 0.7–1.3)
GAMMA GLOB SERPL ELPH-MCNC: 1 G/DL (ref 0.4–1.8)
GLOBULIN SER CALC-MCNC: 2.8 G/DL (ref 2.2–3.9)
M PROTEIN SERPL ELPH-MCNC: NORMAL G/DL
PINP SER-MCNC: 69.9 NG/ML (ref 10.4–97.8)
PROT SERPL-MCNC: 6.8 G/DL (ref 6–8.5)

## 2024-07-05 ENCOUNTER — TELEPHONE (OUTPATIENT)
Dept: RHEUMATOLOGY | Age: 84
End: 2024-07-05

## 2024-07-05 LAB — COLLAGEN CTX SERPL-MCNC: 126 PG/ML

## 2024-07-05 NOTE — TELEPHONE ENCOUNTER
----- Message from Shonda Melendez sent at 6/24/2024  8:48 AM EDT -----  Regarding: RE: prolia  2024 - Prolia - [facility] ADV - [place] req - ded 150.00 met.- OOP - 1200.00 met 182.34 Rem - $ 1017.66 co-ins. 20% 6/24/2024 eh  ----- Message -----  From: Floresita Spain MA  Sent: 6/20/2024   3:19 PM EDT  To: Shonda Melendez; Floresita Spain MA  Subject: FW: prolia                                       Please verify benefits for Prolia. thanks  ----- Message -----  From: Floresita Spain MA  Sent: 6/20/2024   2:59 PM EDT  To: Floresita Spain MA  Subject: prolia                                           Sinan Almeida MD Lipscomb, Shara Y, MA  Patient with new porosis and very high fracture risk and has been on  numerous oral bisphosphonates as well as Evista with   all kinds of side effects mostly musculoskeletal.  HAS NOT BEEN ON DENOSUMAB SINCE RECEIVING GET THIS APPROVED.

## 2024-07-05 NOTE — TELEPHONE ENCOUNTER
PA for Prolia started online on [facility] provider/[employer] site. PA has been approved by [facility] MCR (Marked Oral and IV bisphosphonates are contraindicated due to her history of adverse events.).   PA approval 7/5/24- 7/5/25, auth # [id number]     PHONE CALL to patient to discuss cost and schedule the injection.   She is still considering taking the Prolia.   Exercise with weights and increasing her calcium intake.   Advised her she is at high risk for fracture and the diet and exercise will help maintain her bone health, but he is recommending she begin Prolia to help improve her BMD. Diet and exercise alone will not help. She is wary due to reading the literature and speaking to friends who have had issues with the medication. She said she will call back if she has any questions or decides to move forward with the treatment. She does not have any future appts with Dr. Almeida scheduled. Offered to schedule but she said she would call if she wants to come back.     She wanted him to know she is taking 1000 mg calcium citrate. Vit D 5,000 IU daily

## 2024-07-10 ENCOUNTER — OFFICE VISIT (OUTPATIENT)
Age: 84
End: 2024-07-10
Payer: MEDICARE

## 2024-07-10 VITALS
SYSTOLIC BLOOD PRESSURE: 132 MMHG | DIASTOLIC BLOOD PRESSURE: 62 MMHG | HEART RATE: 68 BPM | WEIGHT: 134 LBS | HEIGHT: 63 IN | BODY MASS INDEX: 23.74 KG/M2

## 2024-07-10 DIAGNOSIS — R07.2 PRECORDIAL PAIN: Primary | ICD-10-CM

## 2024-07-10 PROCEDURE — 1123F ACP DISCUSS/DSCN MKR DOCD: CPT | Performed by: INTERNAL MEDICINE

## 2024-07-10 PROCEDURE — 99213 OFFICE O/P EST LOW 20 MIN: CPT | Performed by: INTERNAL MEDICINE

## 2024-07-10 NOTE — PROGRESS NOTES
[facility] CARDIOLOGY  [address] [place] [address], [address] [place] [address]  PHONE: [phone]        07/10/24        NAME:  Irma Kidd  : 1940  MRN: [id number]     CHIEF COMPLAINT:    dyspnea on exertion      SUBJECTIVE:     No chest pain or palpitation or dizziness.       Medications were all reviewed with the patient today and updated as necessary.   Current Outpatient Medications   Medication Sig    NONFORMULARY Enterovite k-100 (calcium b-hydroxy/proprionate and vit e)    Vitamins A D K (ADK PO) Take by mouth Vit d 5000IU  Vit a  Vit k  Vit d    levothyroxine (SYNTHROID) 75 MCG tablet Take 1 tablet by mouth every morning (before breakfast)    NONFORMULARY Take 2 capsules by mouth daily Joint replete (NMA) [employer]    NONFORMULARY Take 3 capsules by mouth daily Digestzymes (DFH)    NONFORMULARY Take 2 capsules by mouth daily Pregnenolone 25 (BRC) biotic research    NONFORMULARY Take 1 capsule by mouth daily Methyl Guard Plus    traZODone (DESYREL) 50 MG tablet Take 1 tablet by mouth nightly (Patient taking differently: Take 0.5 tablets by mouth nightly)    azelastine (ASTELIN) 0.1 % nasal spray 1 spray by Nasal route 2 times daily    cetirizine (ZYRTEC) 10 MG tablet Take 1 tablet by mouth as needed    Coenzyme Q10 10 MG CAPS Take 19 mg by mouth daily    psyllium (METAMUCIL) 58.6 % packet Take by mouth 2 times daily     No current facility-administered medications for this visit.        Allergies   Allergen Reactions    Tetracyclines & Related Other (See Comments)     She cannot remember reaction - It was many years ago   GI intolerance           PHYSICAL EXAM:     Wt Readings from Last 3 Encounters:   07/10/24 60.8 kg (134 lb)   24 60.8 kg (134 lb)   24 61.7 kg (136 lb)     BP Readings from Last 3 Encounters:   07/10/24 132/62   24 (!) 141/71   24 108/68       /62   Pulse 68   Ht 1.6 m (5' 3\")   Wt 60.8 kg (134 lb)   BMI 23.74 kg/m²     Physical Exam  Vitals

## 2024-07-22 NOTE — TELEPHONE ENCOUNTER
Rocio called patient to see if she wanted to come in to discuss her concerns with the Prolia.     Rocio Hudson Shara Y, MA  Called patient and spoke with her. She does not want the injection and does not want to follow up with Dr. Almeida. She said she will call her PCP and see what they can do for her

## 2024-08-22 DIAGNOSIS — F51.01 PRIMARY INSOMNIA: ICD-10-CM

## 2024-08-22 RX ORDER — TRAZODONE HYDROCHLORIDE 50 MG/1
50 TABLET ORAL NIGHTLY
Qty: 90 TABLET | Refills: 1 | Status: SHIPPED | OUTPATIENT
Start: 2024-08-22

## 2024-11-01 RX ORDER — LEVOTHYROXINE SODIUM 75 UG/1
75 TABLET ORAL
Qty: 90 TABLET | Refills: 0 | Status: SHIPPED | OUTPATIENT
Start: 2024-11-01

## 2025-01-29 RX ORDER — LEVOTHYROXINE SODIUM 75 UG/1
75 TABLET ORAL
Qty: 90 TABLET | Refills: 0 | OUTPATIENT
Start: 2025-01-29